# Patient Record
Sex: MALE | Race: WHITE | NOT HISPANIC OR LATINO | Employment: FULL TIME | URBAN - METROPOLITAN AREA
[De-identification: names, ages, dates, MRNs, and addresses within clinical notes are randomized per-mention and may not be internally consistent; named-entity substitution may affect disease eponyms.]

---

## 2024-10-15 ENCOUNTER — HOSPITAL ENCOUNTER (EMERGENCY)
Facility: HOSPITAL | Age: 19
Discharge: HOME | End: 2024-10-15
Attending: STUDENT IN AN ORGANIZED HEALTH CARE EDUCATION/TRAINING PROGRAM
Payer: COMMERCIAL

## 2024-10-15 VITALS
HEART RATE: 73 BPM | WEIGHT: 180 LBS | SYSTOLIC BLOOD PRESSURE: 126 MMHG | HEIGHT: 66 IN | TEMPERATURE: 98.8 F | RESPIRATION RATE: 17 BRPM | DIASTOLIC BLOOD PRESSURE: 64 MMHG | OXYGEN SATURATION: 99 % | BODY MASS INDEX: 28.93 KG/M2

## 2024-10-15 DIAGNOSIS — T78.40XA ALLERGIC REACTION, INITIAL ENCOUNTER: Primary | ICD-10-CM

## 2024-10-15 PROCEDURE — 99283 EMERGENCY DEPT VISIT LOW MDM: CPT

## 2024-10-15 RX ORDER — EPINEPHRINE 0.3 MG/.3ML
1 INJECTION SUBCUTANEOUS ONCE AS NEEDED
Qty: 1 EACH | Refills: 0 | Status: SHIPPED | OUTPATIENT
Start: 2024-10-15

## 2024-10-15 ASSESSMENT — COLUMBIA-SUICIDE SEVERITY RATING SCALE - C-SSRS
6. HAVE YOU EVER DONE ANYTHING, STARTED TO DO ANYTHING, OR PREPARED TO DO ANYTHING TO END YOUR LIFE?: NO
2. HAVE YOU ACTUALLY HAD ANY THOUGHTS OF KILLING YOURSELF?: NO
1. IN THE PAST MONTH, HAVE YOU WISHED YOU WERE DEAD OR WISHED YOU COULD GO TO SLEEP AND NOT WAKE UP?: NO

## 2024-10-15 ASSESSMENT — LIFESTYLE VARIABLES
EVER FELT BAD OR GUILTY ABOUT YOUR DRINKING: NO
TOTAL SCORE: 0
EVER HAD A DRINK FIRST THING IN THE MORNING TO STEADY YOUR NERVES TO GET RID OF A HANGOVER: NO
HAVE PEOPLE ANNOYED YOU BY CRITICIZING YOUR DRINKING: NO
HAVE YOU EVER FELT YOU SHOULD CUT DOWN ON YOUR DRINKING: NO

## 2024-10-16 NOTE — ED PROVIDER NOTES
HPI   Chief Complaint   Patient presents with   • Allergic Reaction       Patient is a 19-year-old male with no significant past medical history who presents for an allergic reaction.  Patient states he was at the gym around 7 PM when he was leaving and noticed he felt warm and developed hives and a rash on his forehead and neck, states he fell like he was having some difficulty breathing.  Took a Benadryl approximately 1.5 hours ago.  States since then his symptoms have improved, no trouble breathing, wheezing, drooling, vomiting, diarrhea.  States his rash has significantly improved, still some mild rash on his forehead, hives have gone.  Patient states he takes magnesium, fish oil, creatinine prior to his workouts, states has been doing this for months.  States no changes today in his doses.  States he takes a daily vitamin.  No other new medicines, foods, lotions, detergents, known exposures.            Patient History   Past Medical History:   Diagnosis Date   • Allergy status to unspecified drugs, medicaments and biological substances 07/08/2014    History of seasonal allergies   • Other conditions influencing health status 07/08/2014    Behavior problem   • Other specified health status 07/08/2014    No pertinent past surgical history   • Personal history of other diseases of the digestive system 07/08/2014    History of constipation   • Personal history of other drug therapy 10/26/2016    History of influenza vaccination     Past Surgical History:   Procedure Laterality Date   • MYRINGOTOMY W/ TUBES  07/31/2017    Myringotomy - With Ventilating Tube Insertion     No family history on file.  Social History     Tobacco Use   • Smoking status: Not on file   • Smokeless tobacco: Not on file   Substance Use Topics   • Alcohol use: Not on file   • Drug use: Not on file       Physical Exam   ED Triage Vitals [10/15/24 2036]   Temperature Heart Rate Respirations BP   37.1 °C (98.8 °F) 79 18 148/88      Pulse Ox Temp  Source Heart Rate Source Patient Position   98 % Temporal -- Sitting      BP Location FiO2 (%)     Right arm --       Physical Exam  Constitutional:       General: He is not in acute distress.  HENT:      Head: Normocephalic.      Mouth/Throat:      Comments: No posterior oropharynx edema, erythema.  Patient is masking secretions.  No stridor, wheezing.  Eyes:      Extraocular Movements: Extraocular movements intact.      Conjunctiva/sclera: Conjunctivae normal.      Pupils: Pupils are equal, round, and reactive to light.   Cardiovascular:      Rate and Rhythm: Normal rate and regular rhythm.      Pulses: Normal pulses.      Heart sounds: Normal heart sounds.   Pulmonary:      Effort: Pulmonary effort is normal.      Breath sounds: Normal breath sounds.   Abdominal:      General: There is no distension.      Palpations: Abdomen is soft. There is no mass.      Tenderness: There is no abdominal tenderness. There is no guarding.   Musculoskeletal:         General: No deformity.      Cervical back: Normal range of motion and neck supple.      Right lower leg: No edema.      Left lower leg: No edema.   Skin:     General: Skin is warm and dry.      Findings: Rash present. No lesion.      Comments: Scant macular rash across forehead.  No hives.   Neurological:      General: No focal deficit present.      Mental Status: He is alert and oriented to person, place, and time. Mental status is at baseline.      Cranial Nerves: No cranial nerve deficit.      Sensory: No sensory deficit.      Motor: No weakness.   Psychiatric:         Mood and Affect: Mood normal.         ED Course & MDM   Diagnoses as of 10/15/24 2111   Allergic reaction, initial encounter                 No data recorded                                 Medical Decision Making  Patient is a 19-year-old male with above-stated past medical history presents for an allergic reaction.  Patient is clinically well-appearing nontoxic.  He has no signs of airway  compromise on exam.  Given it has been over an hour and a half since he took his Benadryl and he has had almost complete resolution of his symptoms, I do not believe requires additional medications at this time.  I did give him a prescription for and EpiPen and advised him to go directly to the pharmacy to have this filled, he stated understanding agreement.  We discussed return precautions as well and I informed that if he is to use his EpiPen, he should call 911 immediately after using it.  He stated understanding agreement.  Patient was given follow-up with allergy and immunology.  Vital signs are unremarkable. Patient is unsure which insurance he will use at the pharmacy, I did attempt to answer the prior authorization questions I informed him that he should get his prescription filled and pad a pocket and he can be reimbursed later.  I advised him that if he was unable to get his prescription filled he should call [phone] if he has any concerning symptoms.  He stated understanding agreement.  Patient was discharged in stable condition.    Disclaimer: This note was dictated using speech recognition software. Minor errors in transcription may be present. Please call if questions.     Tyler Gallardo MD  [facility] Medicine  Contact on [employer]        Problems Addressed:  Allergic reaction, initial encounter: acute illness or injury        Procedure  Procedures     Tyler Gallardo MD  10/15/24 6720

## 2025-05-05 ENCOUNTER — HOSPITAL ENCOUNTER (EMERGENCY)
Facility: HOSPITAL | Age: 20
Discharge: HOME | End: 2025-05-06
Attending: STUDENT IN AN ORGANIZED HEALTH CARE EDUCATION/TRAINING PROGRAM
Payer: COMMERCIAL

## 2025-05-05 ENCOUNTER — APPOINTMENT (OUTPATIENT)
Dept: RADIOLOGY | Facility: HOSPITAL | Age: 20
End: 2025-05-05
Payer: COMMERCIAL

## 2025-05-05 DIAGNOSIS — S61.219A FINGER LACERATION INVOLVING TENDON, INITIAL ENCOUNTER: Primary | ICD-10-CM

## 2025-05-05 PROCEDURE — 73130 X-RAY EXAM OF HAND: CPT | Mod: RIGHT SIDE | Performed by: RADIOLOGY

## 2025-05-05 PROCEDURE — 96365 THER/PROPH/DIAG IV INF INIT: CPT

## 2025-05-05 PROCEDURE — 2500000004 HC RX 250 GENERAL PHARMACY W/ HCPCS (ALT 636 FOR OP/ED): Mod: JZ | Performed by: STUDENT IN AN ORGANIZED HEALTH CARE EDUCATION/TRAINING PROGRAM

## 2025-05-05 PROCEDURE — 96376 TX/PRO/DX INJ SAME DRUG ADON: CPT

## 2025-05-05 PROCEDURE — 73130 X-RAY EXAM OF HAND: CPT | Mod: RT

## 2025-05-05 PROCEDURE — 99285 EMERGENCY DEPT VISIT HI MDM: CPT | Mod: 25 | Performed by: STUDENT IN AN ORGANIZED HEALTH CARE EDUCATION/TRAINING PROGRAM

## 2025-05-05 PROCEDURE — 96375 TX/PRO/DX INJ NEW DRUG ADDON: CPT

## 2025-05-05 RX ORDER — MIDAZOLAM HYDROCHLORIDE 1 MG/ML
2 INJECTION, SOLUTION INTRAMUSCULAR; INTRAVENOUS ONCE
Status: COMPLETED | OUTPATIENT
Start: 2025-05-05 | End: 2025-05-05

## 2025-05-05 RX ORDER — HYDROMORPHONE HYDROCHLORIDE 1 MG/ML
1 INJECTION, SOLUTION INTRAMUSCULAR; INTRAVENOUS; SUBCUTANEOUS ONCE
Status: COMPLETED | OUTPATIENT
Start: 2025-05-05 | End: 2025-05-05

## 2025-05-05 RX ORDER — CEFAZOLIN SODIUM 2 G/50ML
2 SOLUTION INTRAVENOUS ONCE
Status: COMPLETED | OUTPATIENT
Start: 2025-05-05 | End: 2025-05-05

## 2025-05-05 RX ADMIN — CEFAZOLIN SODIUM 2 G: 2 SOLUTION INTRAVENOUS at 21:39

## 2025-05-05 RX ADMIN — HYDROMORPHONE HYDROCHLORIDE 1 MG: 1 INJECTION, SOLUTION INTRAMUSCULAR; INTRAVENOUS; SUBCUTANEOUS at 20:13

## 2025-05-05 RX ADMIN — MIDAZOLAM 2 MG: 1 INJECTION INTRAMUSCULAR; INTRAVENOUS at 23:51

## 2025-05-05 RX ADMIN — HYDROMORPHONE HYDROCHLORIDE 1 MG: 1 INJECTION, SOLUTION INTRAMUSCULAR; INTRAVENOUS; SUBCUTANEOUS at 22:14

## 2025-05-05 ASSESSMENT — PAIN - FUNCTIONAL ASSESSMENT
PAIN_FUNCTIONAL_ASSESSMENT: 0-10
PAIN_FUNCTIONAL_ASSESSMENT: 0-10

## 2025-05-05 ASSESSMENT — PAIN SCALES - GENERAL
PAINLEVEL_OUTOF10: 10 - WORST POSSIBLE PAIN
PAINLEVEL_OUTOF10: 10 - WORST POSSIBLE PAIN

## 2025-05-05 NOTE — Clinical Note
Jorge Lawton was seen and treated in our emergency department on 5/5/2025.  He may return to work on 05/08/2025.       If you have any questions or concerns, please don't hesitate to call.      Jorge Chu MD

## 2025-05-06 ENCOUNTER — HOSPITAL ENCOUNTER (OUTPATIENT)
Dept: RADIOLOGY | Facility: CLINIC | Age: 20
Discharge: HOME | End: 2025-05-06
Payer: COMMERCIAL

## 2025-05-06 ENCOUNTER — OFFICE VISIT (OUTPATIENT)
Dept: ORTHOPEDIC SURGERY | Facility: CLINIC | Age: 20
End: 2025-05-06
Payer: COMMERCIAL

## 2025-05-06 VITALS
SYSTOLIC BLOOD PRESSURE: 143 MMHG | HEART RATE: 72 BPM | DIASTOLIC BLOOD PRESSURE: 82 MMHG | RESPIRATION RATE: 16 BRPM | BODY MASS INDEX: 31.47 KG/M2 | WEIGHT: 195 LBS | OXYGEN SATURATION: 98 % | TEMPERATURE: 99 F

## 2025-05-06 DIAGNOSIS — S61.209A EXTENSOR TENDON LACERATION OF FINGER WITH OPEN WOUND, INITIAL ENCOUNTER: ICD-10-CM

## 2025-05-06 DIAGNOSIS — S67.21XA CRUSH INJURY OF HAND, RIGHT, INITIAL ENCOUNTER: ICD-10-CM

## 2025-05-06 DIAGNOSIS — G89.18 POST-OP PAIN: ICD-10-CM

## 2025-05-06 DIAGNOSIS — S56.429A EXTENSOR TENDON LACERATION OF FINGER WITH OPEN WOUND, INITIAL ENCOUNTER: ICD-10-CM

## 2025-05-06 DIAGNOSIS — S62.620B DISPLACED FRACTURE OF MIDDLE PHALANX OF RIGHT INDEX FINGER, INITIAL ENCOUNTER FOR OPEN FRACTURE: Primary | ICD-10-CM

## 2025-05-06 PROCEDURE — 99153 MOD SED SAME PHYS/QHP EA: CPT | Performed by: STUDENT IN AN ORGANIZED HEALTH CARE EDUCATION/TRAINING PROGRAM

## 2025-05-06 PROCEDURE — 90471 IMMUNIZATION ADMIN: CPT | Performed by: STUDENT IN AN ORGANIZED HEALTH CARE EDUCATION/TRAINING PROGRAM

## 2025-05-06 PROCEDURE — 96375 TX/PRO/DX INJ NEW DRUG ADDON: CPT | Mod: 59

## 2025-05-06 PROCEDURE — 2500000004 HC RX 250 GENERAL PHARMACY W/ HCPCS (ALT 636 FOR OP/ED): Performed by: STUDENT IN AN ORGANIZED HEALTH CARE EDUCATION/TRAINING PROGRAM

## 2025-05-06 PROCEDURE — 73140 X-RAY EXAM OF FINGER(S): CPT | Mod: RT

## 2025-05-06 PROCEDURE — 99205 OFFICE O/P NEW HI 60 MIN: CPT | Performed by: ORTHOPAEDIC SURGERY

## 2025-05-06 PROCEDURE — 96376 TX/PRO/DX INJ SAME DRUG ADON: CPT | Mod: 59

## 2025-05-06 PROCEDURE — 99213 OFFICE O/P EST LOW 20 MIN: CPT | Mod: 25 | Performed by: ORTHOPAEDIC SURGERY

## 2025-05-06 PROCEDURE — 90715 TDAP VACCINE 7 YRS/> IM: CPT | Mod: JZ | Performed by: STUDENT IN AN ORGANIZED HEALTH CARE EDUCATION/TRAINING PROGRAM

## 2025-05-06 PROCEDURE — 12042 INTMD RPR N-HF/GENIT2.6-7.5: CPT | Performed by: STUDENT IN AN ORGANIZED HEALTH CARE EDUCATION/TRAINING PROGRAM

## 2025-05-06 PROCEDURE — 94681 O2 UPTK CO2 OUTP % O2 XTRC: CPT | Mod: 59

## 2025-05-06 PROCEDURE — 2500000005 HC RX 250 GENERAL PHARMACY W/O HCPCS: Performed by: STUDENT IN AN ORGANIZED HEALTH CARE EDUCATION/TRAINING PROGRAM

## 2025-05-06 PROCEDURE — 2500000004 HC RX 250 GENERAL PHARMACY W/ HCPCS (ALT 636 FOR OP/ED): Mod: JZ

## 2025-05-06 RX ORDER — ONDANSETRON HYDROCHLORIDE 2 MG/ML
4 INJECTION, SOLUTION INTRAVENOUS ONCE
Status: COMPLETED | OUTPATIENT
Start: 2025-05-06 | End: 2025-05-06

## 2025-05-06 RX ORDER — CEPHALEXIN 500 MG/1
500 CAPSULE ORAL 4 TIMES DAILY
Qty: 40 CAPSULE | Refills: 0 | Status: SHIPPED | OUTPATIENT
Start: 2025-05-06 | End: 2025-05-16

## 2025-05-06 RX ORDER — OXYCODONE AND ACETAMINOPHEN 5; 325 MG/1; MG/1
1 TABLET ORAL EVERY 6 HOURS PRN
Qty: 12 TABLET | Refills: 0 | Status: SHIPPED | OUTPATIENT
Start: 2025-05-06 | End: 2025-05-06 | Stop reason: SDUPTHER

## 2025-05-06 RX ORDER — ONDANSETRON HYDROCHLORIDE 2 MG/ML
INJECTION, SOLUTION INTRAVENOUS
Status: COMPLETED
Start: 2025-05-06 | End: 2025-05-06

## 2025-05-06 RX ORDER — MIDAZOLAM HYDROCHLORIDE 1 MG/ML
2 INJECTION, SOLUTION INTRAMUSCULAR; INTRAVENOUS ONCE
Status: COMPLETED | OUTPATIENT
Start: 2025-05-06 | End: 2025-05-06

## 2025-05-06 RX ORDER — OXYCODONE AND ACETAMINOPHEN 5; 325 MG/1; MG/1
1 TABLET ORAL EVERY 8 HOURS PRN
Qty: 20 TABLET | Refills: 0 | Status: SHIPPED | OUTPATIENT
Start: 2025-05-06 | End: 2025-05-06 | Stop reason: SDUPTHER

## 2025-05-06 RX ORDER — OXYCODONE AND ACETAMINOPHEN 5; 325 MG/1; MG/1
1 TABLET ORAL EVERY 8 HOURS PRN
Qty: 20 TABLET | Refills: 0 | Status: SHIPPED | OUTPATIENT
Start: 2025-05-06 | End: 2025-05-13

## 2025-05-06 RX ADMIN — ONDANSETRON HYDROCHLORIDE 4 MG: 2 INJECTION, SOLUTION INTRAVENOUS at 02:22

## 2025-05-06 RX ADMIN — ONDANSETRON 4 MG: 2 INJECTION INTRAMUSCULAR; INTRAVENOUS at 02:22

## 2025-05-06 RX ADMIN — Medication 89 MG: at 01:34

## 2025-05-06 RX ADMIN — Medication 44 MG: at 01:55

## 2025-05-06 RX ADMIN — MIDAZOLAM 2 MG: 1 INJECTION INTRAMUSCULAR; INTRAVENOUS at 01:45

## 2025-05-06 RX ADMIN — TETANUS TOXOID, REDUCED DIPHTHERIA TOXOID AND ACELLULAR PERTUSSIS VACCINE, ADSORBED 0.5 ML: 5; 2.5; 8; 8; 2.5 SUSPENSION INTRAMUSCULAR at 03:54

## 2025-05-06 RX ADMIN — Medication 2 L/MIN: at 01:33

## 2025-05-06 ASSESSMENT — LIFESTYLE VARIABLES
EVER FELT BAD OR GUILTY ABOUT YOUR DRINKING: NO
HAVE YOU EVER FELT YOU SHOULD CUT DOWN ON YOUR DRINKING: NO
TOTAL SCORE: 0
HAVE PEOPLE ANNOYED YOU BY CRITICIZING YOUR DRINKING: NO
EVER HAD A DRINK FIRST THING IN THE MORNING TO STEADY YOUR NERVES TO GET RID OF A HANGOVER: NO

## 2025-05-06 NOTE — ED PROVIDER NOTES
Emergency Department Provider Note       History of Present Illness     History provided by: Patient  Limitations to History: None  External Records Reviewed with Brief Summary: None    HPI:  Jorge Lawton is a 19 y.o. male brought in by EMS from work after injury to right index finger.  Patient was working with an industrial  and his finger got caught in a small hole.  Right-hand-dominant.     Physical Exam   Triage vitals:  T 37.2 °C (99 °F)  HR (!) 114  BP (!) 192/90  RR (!) 24  O2 100 % None (Room air)    Physical Exam  Vitals and nursing note reviewed.   Constitutional:       Appearance: Normal appearance. He is not ill-appearing.   HENT:      Head: Atraumatic.   Eyes:      Conjunctiva/sclera: Conjunctivae normal.   Cardiovascular:      Rate and Rhythm: Normal rate.   Pulmonary:      Effort: Pulmonary effort is normal. No respiratory distress.   Abdominal:      General: There is no distension.   Musculoskeletal:        Hands:       Cervical back: Normal range of motion.      Comments: Partial avulsion of distal aspect of right index finger on dorsal side including entire nailbed   Neurological:      Mental Status: He is alert. Mental status is at baseline.   Psychiatric:         Behavior: Behavior normal.           Medical Decision Making & ED Course   Medical Decision Makin y.o. male presenting from work after industrial  accident involving right index finger.  Patient is right-hand dominant.  Large defect just proximal to the nailbed margin on the dorsal aspect of the finger.  Concern for possible extensor tendon involvement.  Patient also has gaping stellate laceration on the pad of the distal index finger likely from crush mechanism.  Patient empirically treated with Ancef for concerns of open fracture.  X-ray obtained.  Patient given hydromorphone IV for analgesia.  Tetanus updated.    Attempted to perform digital block for laceration repair.  Patient has significant phobia with  needles, did not tolerate digital block.  Patient screaming and pulling away.  Concerns over safety due to needlestick or inadvertent injury.  Attempted to treat underlying anxiety with Versed IV.  Was able to perform remainder of digital block, however patient continues to scream out and pull away when attempting sutures.  Due to safety concerns decision was made to proceed with procedural sedation in order to adequately repair large laceration.    See separate procedure notes for detail.  Able to complete laceration repair under sedation.  Large laceration on the dorsal aspect was tacked down, will need follow-up with hand surgery due to concerns for tendon injury and devitalized tissue given extensive area of near complete avulsion.  Superficial skin on the palmar aspect loosely approximated as best as possible given swelling and significant tension.   underlying pulp appears to be intact.  Splinted in full extension.  Will provide with outpatient antibiotics for prophylaxis.  OARRS report was reviewed and verified.  Patient provided with prescription for Percocet to use for pain as needed.  Plan for follow-up with hand surgery in the next few days.    ----        ED Course:  Diagnoses as of 05/06/25 0245   Finger laceration involving tendon, initial encounter       Disposition   As a result of the work-up, the patient was discharged home.  he was informed of his diagnosis and instructed to come back with any concerns or worsening of condition.  he and was agreeable to the plan as discussed above.  he was given the opportunity to ask questions.  All of the patient's questions were answered.    Procedures   Laceration Repair    Performed by: Jorge Chu MD  Authorized by: Jorge Chu MD    Consent:     Consent obtained:  Verbal    Consent given by:  Patient    Risks discussed:  Infection, pain, poor wound healing, need for additional repair and tendon damage  Anesthesia:     Anesthesia method:   Nerve block    Block location:  Digital    Block needle gauge:  25 G    Block anesthetic:  Lidocaine 1% w/o epi    Block injection procedure:  Anatomic landmarks identified, introduced needle, incremental injection, anatomic landmarks palpated and negative aspiration for blood    Block outcome:  Incomplete block  Laceration details:     Location:  Finger    Finger location:  R index finger    Length (cm):  4  Exploration:     Contaminated: yes    Treatment:     Area cleansed with:  Saline and chlorhexidine    Amount of cleaning:  Extensive    Cleaning detail: contamination or visible debris requiring removal and/or extensive cleaning      Irrigation solution:  Sterile saline    Irrigation method:  Syringe  Skin repair:     Repair method:  Sutures    Suture size:  4-0    Suture material:  Prolene    Suture technique:  Simple interrupted    Number of sutures:  9  Approximation:     Approximation:  Close  Repair type:     Repair type:  Intermediate  Post-procedure details:     Dressing:  Splint for protection (Xeroform)    Procedure completion:  Tolerated  Laceration Repair    Performed by: Jorge Chu MD  Authorized by: Jorge Chu MD    Consent:     Consent obtained:  Verbal    Consent given by:  Patient    Risks discussed:  Infection, pain, poor cosmetic result and poor wound healing  Anesthesia:     Anesthesia method:  Local infiltration    Local anesthetic:  Lidocaine 1% w/o epi  Laceration details:     Location:  Finger    Finger location:  R index finger    Length (cm):  2 (Stellate)  Treatment:     Area cleansed with:  Chlorhexidine and saline    Amount of cleaning:  Extensive    Cleaning detail: contamination or visible debris requiring removal and/or extensive cleaning      Irrigation solution:  Sterile saline    Irrigation method:  Syringe  Skin repair:     Repair method:  Sutures    Suture size:  4-0    Suture material:  Prolene    Suture technique:  Simple interrupted    Number of sutures:   4  Approximation:     Approximation:  Loose  Repair type:     Repair type:  Intermediate  Post-procedure details:     Procedure completion:  Tolerated  Moderate Sedation    Performed by: Jorge Chu MD  Authorized by: Jorge Chu MD    Consent:     Consent obtained:  Written    Consent given by:  Patient    Risks discussed:  Respiratory compromise necessitating ventilatory assistance and intubation, nausea, vomiting, inadequate sedation, allergic reaction and prolonged hypoxia resulting in organ damage    Alternatives discussed:  Anxiolysis  Universal protocol:     Protocol observed: The universal protocol was observed before the procedure and is documented in the nursing flowsheets    Indications:     Procedure performed:  Laceration repair    Procedure necessitating sedation performed by:  Physician performing sedation    Level of sedation:  Moderate  Pre-sedation assessment:     Mallampati score:  I - soft palate, uvula, fauces, pillars visible  Immediate pre-procedure details:     Monitoring: The patient is on appropriate monitoring (Including: 3 or 5 lead EKG, Pulse Oximetry, Capnography, and Blood Pressure monitoring), oxygenation has been addressed, and critical airway and emergency equipment is immediately available before the initiation of sedation      Reassessment: Patient reassessed immediately prior to procedure      Reviewed: vital signs and NPO status    Procedure details (see MAR for exact dosages):     Sedation start time:  5/6/2025 1:35 AM    Sedation end time:  5/6/2025 2:05 AM    Total sedation time (minutes):  30  Post-procedure details:     Attendance: Constant attendance by certified staff until patient recovered      Procedure completion:  Tolerated          Jorge Chu MD  Emergency Medicine                                                            Jorge Chu MD  05/06/25 2071

## 2025-05-06 NOTE — PROGRESS NOTES
History present illness: Patient presents today for evaluation status post injury to the right index finger that occurred in the workplace.  He was working with a .  The patient was seen in the emergency department.  Basic wound care was provided.  Sutures were placed.  Tetanus was updated.  He was given antibiotics while in the emergency department and then a prescription which he did fill.  He presents with his mother.      Past medical history: The patient's past medical history, family history, social history, and review of systems were documented on the patient medical intake.  The updated data was reviewed in the electronic medical record.  History is negative except otherwise stated in history of present illness.        Physical examination:  General: Alert and oriented to person, place, and time.  No acute distress and breathing comfortably: Pleasant and cooperative with examination.  HEENT: Head is normocephalic and atraumatic.  Neck: Supple, no visible swelling.  Cardiovascular: No palpable tachycardia  Lungs: No audible wheezing or labored breathing  Abdomen: Nondistended.  Extremities: Evaluation of the right upper extremity finds the patient had palpable radial artery at the wrist with brisk capillary refill to all digits.  Patient has intact sensation to axillary radial median and ulnar nerves.  Complex laceration to extensor zone 1 right index.  There are no signs of infection.  There is no evidence of lymphedema or lymphatic streaking.  The patient has supple compartments to right arm forearm and hand.  Very anxious and apprehensive.  Demonstrates no active range of motion through extensor zone 1 or profundus to index.      Radiology: Right index finger fracture of middle phalanx with likely intra-articular extension.  Particulate noted on lateral image dorsal to the distal phalanx likely consistent with indwelling foreign body      Assessment: Flex right index fingertip injury with open  fracture of middle phalanx indwelling particulate and complex soft tissue injury      Plan: Treatment options were discussed.  We talked about operative and nonoperative strategies.  Recommendations were made for open exploration with excisional debridement with operative stabilization of bony injury and removal of contaminant with soft tissue repair as indicated.  Patient is agreeable with this strategy.  Nonadherent soft dressing and splint were placed.  Plan for surgery tomorrow under digital block anesthesia with sedation versus general anesthetic.  Persist with oral antibiotic dosing.  Seek stat approval for surgery tomorrow to minimize risk of osteomyelitis.        Procedure:

## 2025-05-06 NOTE — DISCHARGE INSTRUCTIONS
Please follow-up with hand surgery in 1 to 2 days for further evaluation and management.  Take antibiotics as prescribed for prevention of infection.  Keep splint on finger, keep dressing dry.

## 2025-05-07 PROCEDURE — 20103 EXPL PENTRG WOUND EXTREMITY: CPT | Performed by: PHYSICIAN ASSISTANT

## 2025-05-07 PROCEDURE — 26418 REPAIR FINGER TENDON: CPT | Performed by: ORTHOPAEDIC SURGERY

## 2025-05-07 PROCEDURE — 20103 EXPL PENTRG WOUND EXTREMITY: CPT | Performed by: ORTHOPAEDIC SURGERY

## 2025-05-07 PROCEDURE — 11760 REPAIR OF NAIL BED: CPT | Performed by: ORTHOPAEDIC SURGERY

## 2025-05-07 PROCEDURE — 26746 TREAT FINGER FRACTURE EACH: CPT | Performed by: ORTHOPAEDIC SURGERY

## 2025-05-07 PROCEDURE — 11012 DEB SKIN BONE AT FX SITE: CPT | Performed by: ORTHOPAEDIC SURGERY

## 2025-05-20 ENCOUNTER — OFFICE VISIT (OUTPATIENT)
Dept: ORTHOPEDIC SURGERY | Facility: CLINIC | Age: 20
End: 2025-05-20
Payer: COMMERCIAL

## 2025-05-20 ENCOUNTER — HOSPITAL ENCOUNTER (OUTPATIENT)
Dept: RADIOLOGY | Facility: CLINIC | Age: 20
Discharge: HOME | End: 2025-05-20
Payer: COMMERCIAL

## 2025-05-20 DIAGNOSIS — S61.209A EXTENSOR TENDON LACERATION OF FINGER WITH OPEN WOUND, INITIAL ENCOUNTER: ICD-10-CM

## 2025-05-20 DIAGNOSIS — S67.21XA CRUSH INJURY OF HAND, RIGHT, INITIAL ENCOUNTER: ICD-10-CM

## 2025-05-20 DIAGNOSIS — S61.219A FINGER LACERATION INVOLVING TENDON, INITIAL ENCOUNTER: ICD-10-CM

## 2025-05-20 DIAGNOSIS — S62.620B DISPLACED FRACTURE OF MIDDLE PHALANX OF RIGHT INDEX FINGER, INITIAL ENCOUNTER FOR OPEN FRACTURE: Primary | ICD-10-CM

## 2025-05-20 DIAGNOSIS — S56.429A EXTENSOR TENDON LACERATION OF FINGER WITH OPEN WOUND, INITIAL ENCOUNTER: ICD-10-CM

## 2025-05-20 PROCEDURE — 99024 POSTOP FOLLOW-UP VISIT: CPT | Performed by: ORTHOPAEDIC SURGERY

## 2025-05-20 PROCEDURE — 73140 X-RAY EXAM OF FINGER(S): CPT | Mod: RT

## 2025-05-20 PROCEDURE — 99213 OFFICE O/P EST LOW 20 MIN: CPT | Performed by: ORTHOPAEDIC SURGERY

## 2025-05-20 RX ORDER — MULTIVITAMIN WITH IRON
1 TABLET ORAL DAILY
COMMUNITY

## 2025-05-21 ENCOUNTER — TELEPHONE (OUTPATIENT)
Dept: ORTHOPEDIC SURGERY | Facility: CLINIC | Age: 20
End: 2025-05-21
Payer: COMMERCIAL

## 2025-05-21 ENCOUNTER — OFFICE VISIT (OUTPATIENT)
Dept: ORTHOPEDIC SURGERY | Facility: CLINIC | Age: 20
End: 2025-05-21
Payer: COMMERCIAL

## 2025-05-21 DIAGNOSIS — M25.562 LEFT KNEE PAIN, UNSPECIFIED CHRONICITY: Primary | ICD-10-CM

## 2025-05-21 PROCEDURE — 99212 OFFICE O/P EST SF 10 MIN: CPT | Performed by: INTERNAL MEDICINE

## 2025-05-21 PROCEDURE — 99024 POSTOP FOLLOW-UP VISIT: CPT | Performed by: INTERNAL MEDICINE

## 2025-05-21 NOTE — TELEPHONE ENCOUNTER
Patient called stating he didn't receive a splint fro his finger while at his visit he was supposed to get one. Patient was called and advised to come into the walk in clinic.   Patient understood.

## 2025-05-21 NOTE — TELEPHONE ENCOUNTER
Patient called the clinical line and spoke with ARMANDO who told him to come to the walk in clinic to get re-splinted.    I wrote note for his work.

## 2025-05-21 NOTE — TELEPHONE ENCOUNTER
Jorge called in he is concerned that he did not get a splint yesterday during his appointment and is wondering if he should have one. He would also like work note stating he was at the appointment that he is not cleared to return to work.

## 2025-05-21 NOTE — LETTER
May 21, 2025     Patient: Jorge Lawton   YOB: 2005   Date of Visit: 5/20/2025       To Whom It May Concern:    Jorge Lawton was seen in my clinic on 5/20/2025 at . Please excuse Jorge for his absence from work on this day to make the appointment.  He will remain out of work until cleared by orthopedics.    If you have any questions or concerns, please don't hesitate to call [phone].         Sincerely,         Thomas Swan, DO

## 2025-05-21 NOTE — PROGRESS NOTES
Acute Injury New Patient Visit    CC: No chief complaint on file.      HPI: Jorge is a 19 y.o. male presents today for splint application .        Review of Systems   GENERAL: Negative for malaise, significant weight loss, fever  MUSCULOSKELETAL: See HPI  NEURO:  Negative for numbness / tingling     Past Medical History  Medical History[1]    Medication review  Medication Documentation Review Audit       Reviewed by Mar Gipson MA (Medical Assistant) on 25 at 1543      Medication Order Taking? Sig Documenting Provider Last Dose Status   cephalexin (Keflex) 500 mg capsule [id number]  Take 1 capsule (500 mg) by mouth 4 times a day for 10 days. Jorge Chu MD   25 2359   EPINEPHrine (Epipen) 0.3 mg/0.3 mL injection syringe [id number]  Inject 0.3 mL (0.3 mg) into the muscle 1 time if needed for anaphylaxis for up to 1 dose. Inject into upper leg. Call 911 after use. Tyler Gallardo MD  Active   multivitamin (Multiple Vitamins) tablet [id number]  Take 1 tablet by mouth once daily. Historical Provider, MD  Active   oxyCODONE-acetaminophen (Percocet) 5-325 mg tablet [id number]  Take 1 tablet by mouth every 8 hours if needed for severe pain (7 - 10) for up to 7 days. Veronica Holt PA-C   25 235                    Allergies  RX Allergies[2]    Social History  Social History     Socioeconomic History    Marital status: Single     Spouse name: Not on file    Number of children: Not on file    Years of education: Not on file    Highest education level: Not on file   Occupational History    Not on file   Tobacco Use    Smoking status: Unknown    Smokeless tobacco: Not on file   Substance and Sexual Activity    Alcohol use: Not on file    Drug use: Not on file    Sexual activity: Not on file   Other Topics Concern    Not on file   Social History Narrative    Not on file     Social Drivers of Health     Financial Resource Strain: Not on file   Food Insecurity: Not on file    Transportation Needs: Not on file   Physical Activity: Not on file   Stress: Not on file   Social Connections: Not on file   Intimate Partner Violence: Not on file   Housing Stability: Not on file       Surgical History  Surgical History[3]    Physical Exam:  GENERAL:  Patient is awake, alert, and oriented to person place and time.  Patient appears well nourished and well kept.  Affect Calm, Not Acutely Distressed.  HEENT:  Normocephalic, Atraumatic, EOMI  CARDIOVASCULAR:  Hemodynamically stable.  RESPIRATORY:  Normal respirations with unlabored breathing.  Extremity: Right index finger shows surgical pain intact.  There is no clinical signs infection.      Diagnostics: None today  XR fingers right 2+ views  Interpreted By:  Thomas Daly,   STUDY:  XR FINGERS RIGHT 2+ VIEWS; 5/20/2025 3:54 pm      INDICATION:  Signs/Symptoms:pain.      ACCESSION NUMBER(S):  [id number]      ORDERING CLINICIAN:  THOMAS DALY      FINDINGS:  X-rays of the right index finger show complex fracture to the middle  and distal phalanx. Indwelling K-wire fixation shows no hardware  failure or migration.          Signed by: Thomas Daly 5/20/2025 3:58 PM  Dictation workstation:   [id number]      Procedure: None    Assessment: Splint application    Plan: Jorge presents today for splint application, a U-shaped AlumaFoam splint was applied.  He will keep his appointment as scheduled with Dr. Daly.    No orders of the defined types were placed in this encounter.     At the conclusion of the visit there were no further questions by the patient/family regarding their plan of care.  Patient was instructed to call or return with any issues, questions, or concerns regarding their injury and/or treatment plan described above.     05/21/25 at 4:09 PM - Alfonso Johnston MD    Office: [phone]    We already utilize the scribe attestation, Aamir ESCUDERO am scribing for, and in the presence of Dr. Johnston.    Alfonso ESCUDERO MD  personally performed the services described in the documentation as scribed by Aamir Hardin in my presence, and confirm it is both accurate and complete.    This note was prepared using voice recognition software.  The details of this note are correct and have been reviewed, and corrected to the best of my ability.  Some grammatical errors may persist related to the Dragon software.         [1]   Past Medical History:  Diagnosis Date    Allergy status to unspecified drugs, medicaments and biological substances 07/08/2014    History of seasonal allergies    Other conditions influencing health status 07/08/2014    Behavior problem    Other specified health status 07/08/2014    No pertinent past surgical history    Personal history of other diseases of the digestive system 07/08/2014    History of constipation    Personal history of other drug therapy 10/26/2016    History of influenza vaccination   [2]   Allergies  Allergen Reactions    Bee Pollen Other    Tree And Shrub Pollen Itching   [3]   Past Surgical History:  Procedure Laterality Date    MYRINGOTOMY W/ TUBES  07/31/2017    Myringotomy - With Ventilating Tube Insertion

## 2025-06-02 ENCOUNTER — HOSPITAL ENCOUNTER (OUTPATIENT)
Dept: RADIOLOGY | Facility: CLINIC | Age: 20
Discharge: HOME | End: 2025-06-02
Payer: COMMERCIAL

## 2025-06-02 ENCOUNTER — OFFICE VISIT (OUTPATIENT)
Dept: ORTHOPEDIC SURGERY | Facility: CLINIC | Age: 20
End: 2025-06-02
Payer: COMMERCIAL

## 2025-06-02 DIAGNOSIS — S61.219A FINGER LACERATION INVOLVING TENDON, INITIAL ENCOUNTER: ICD-10-CM

## 2025-06-02 PROCEDURE — 99211 OFF/OP EST MAY X REQ PHY/QHP: CPT | Performed by: ORTHOPAEDIC SURGERY

## 2025-06-02 PROCEDURE — 73140 X-RAY EXAM OF FINGER(S): CPT | Mod: RIGHT SIDE | Performed by: RADIOLOGY

## 2025-06-02 PROCEDURE — 73140 X-RAY EXAM OF FINGER(S): CPT | Mod: RT

## 2025-06-02 NOTE — PROGRESS NOTES
6/2/2025    Chief Complaint   Patient presents with    Right Index Finger - Follow-up     I&D with open treatment to middle phalanx fx  DOS: 5/7/25  Xrays today       History of Present Illness:  Patient Jorge Lawton , 19 y.o. male, presents today, 6/2/2025, for evaluation of right index finger complex open injury to right index finger of middle phalanx fracture, he is 4 weeks out from surgical intervention.  He states he been compliant with nonweightbearing restrictions and splinting.  He has been apprehensive to use the finger at all.  His therapy was just recently approved and he is scheduled for his first visit in 2 days on Wednesday.  No fevers, chills, constitutional symptoms.         Review of Systems:   GENERAL: Negative  GI: Negative  MUSCULOSKELETAL: See HPI  SKIN: Negative  NEURO:  Negative     Physical Exam:  GENERAL:  Alert and oriented to person, place, and time.  No acute distress and breathing comfortably; pleasant and cooperative with the examination.  HEENT:  Head is normocephalic and atraumatic.  NECK:  Supple, no visible swelling.  CARDIOVASCULAR:  No palpable tachycardia.  LUNGS:  No audible wheezing or labored breathing.  ABDOMEN:  Nondistended.  Extremities: The surgical incision is clean, dry, intact, and appears to be healing well.  No active bleeding, erythema, warmth, drainage, or signs of infection.  Appropriate functional ROM demonstrated with flexion/extension of the digits, and flexion/extension/pronosupination of the wrist.  Passive extension maintained across distal interphalangeal joint.  He is tender and swollen across the PIP with very limited range of motion to flexion of only about 35 degrees.     Imaging/Test Results:  3 views of the right index finger show evidence of healing middle phalanx fracture with continued adequate alignment.  No evidence of hardware failure or migration.     Assessment:  Complex open injury to right index finger middle phalanx fracture, 4 weeks  out from excisional debridement and open treatment.     Plan:  Continue strict nonweightbearing and remain out of work.  We will remove the transverse pin today.  This was performed in office and tolerated well.  Will retain the longitudinal pin for an additional 4 weeks.  In 4 weeks he will follow-up for repeat clinical and radiographic exam, x-rays 3 views of the right index finger upon return.  We discussed with patient and family we would recommend for digital block at that visit and then pin removal.  Patient was amenable to this.  After pin was removed sterile dressing was applied and patient instructed to keep this intact for 24 hours then transition to simple Band-Aid and continue with U-shaped AlumaFoam splinting.  All questions answered today's visit.    Veronica Holt PA-C

## 2025-06-04 ENCOUNTER — EVALUATION (OUTPATIENT)
Dept: OCCUPATIONAL THERAPY | Facility: CLINIC | Age: 20
End: 2025-06-04
Payer: COMMERCIAL

## 2025-06-04 DIAGNOSIS — S56.429A EXTENSOR TENDON LACERATION OF FINGER WITH OPEN WOUND, INITIAL ENCOUNTER: ICD-10-CM

## 2025-06-04 DIAGNOSIS — S61.219A FINGER LACERATION INVOLVING TENDON, INITIAL ENCOUNTER: Primary | ICD-10-CM

## 2025-06-04 DIAGNOSIS — S62.620B DISPLACED FRACTURE OF MIDDLE PHALANX OF RIGHT INDEX FINGER, INITIAL ENCOUNTER FOR OPEN FRACTURE: ICD-10-CM

## 2025-06-04 DIAGNOSIS — S61.209A EXTENSOR TENDON LACERATION OF FINGER WITH OPEN WOUND, INITIAL ENCOUNTER: ICD-10-CM

## 2025-06-04 DIAGNOSIS — S67.21XA CRUSH INJURY OF HAND, RIGHT, INITIAL ENCOUNTER: ICD-10-CM

## 2025-06-04 PROCEDURE — 97165 OT EVAL LOW COMPLEX 30 MIN: CPT | Mod: GO

## 2025-06-04 PROCEDURE — 97110 THERAPEUTIC EXERCISES: CPT | Mod: GO

## 2025-06-04 ASSESSMENT — PATIENT HEALTH QUESTIONNAIRE - PHQ9
SUM OF ALL RESPONSES TO PHQ9 QUESTIONS 1 AND 2: 0
2. FEELING DOWN, DEPRESSED OR HOPELESS: NOT AT ALL
1. LITTLE INTEREST OR PLEASURE IN DOING THINGS: NOT AT ALL

## 2025-06-04 ASSESSMENT — PAIN DESCRIPTION - DESCRIPTORS: DESCRIPTORS: ACHING

## 2025-06-04 ASSESSMENT — PAIN - FUNCTIONAL ASSESSMENT: PAIN_FUNCTIONAL_ASSESSMENT: 0-10

## 2025-06-04 ASSESSMENT — PAIN SCALES - GENERAL: PAINLEVEL_OUTOF10: 2

## 2025-06-04 NOTE — PROGRESS NOTES
"    Occupational Therapy   Upper Extremity Evaluation Note    Patient Name: Jorge Lawton  MRN: [id number]   Date of Injury: 5/5/25  Mechanism of Injury: Work injury got stuck in    Date of Surgery: 5/7/25  Surgical Procedure: Pins and extensor tendon   Precautions:  NWB RUE          Current Problem  1. Finger laceration involving tendon, initial encounter  Referral to Occupational Therapy      2. Displaced fracture of middle phalanx of right index finger, initial encounter for open fracture  Referral to Occupational Therapy      3. Extensor tendon laceration of finger with open wound, initial encounter  Referral to Occupational Therapy      4. Crush injury of hand, right, initial encounter  Referral to Occupational Therapy       Insurance  [facility] OT 12V OPEN DATE RANGE-[place]     Medicare Certification Period:     GENERAL  General  General Comment: Visit #1/12    IMAGING: Redemonstration of fracture K-wire fixation of a comminuted fracture  through the 2nd distal phalanx. Osseous productive changes and soft  tissue edema present. The hardware is intact           Subjective  Pt report: \"I am just scared I am going to hurt it.\"  Patient with good adherence to NWB RUE per report, also changing dressing daily letting index finger air dry for 1-2 hours while resting with hand/arm supported   Patient would like to functionally improve/chief concern: Increase ROM, decrease edema, get strength back, return to work and lifting at the gym       Pain:  Location: Entire index finger Right   0/10 at rest, 9/10 at highest  Description: aching, throbbing, sharp sometimes     ASSESSMENT:    Patient is a 19 y.o. RHDM who is s/p pin fixation of PIP and DIP of R IF with extensor tendon involved     Homeliving/Social Support: house multilevel, Girlfriend, girlfriends grandmother, and girlfriends little sister     Work: [employer]     Meaningful Activities: go to the gym, spending time with family and friends, fishing, " driving    Previous Level of Function Per Patient/Caregiver Report: Independent with ADL, IADL, work and leisure activities    Chief complaint: stiffness, pain, swelling, arm stiffness     Patient stated goals for therapy: gain use of my finger     Objective  Hand Dominance: RIGHT     Affected Extremity:   RIGHT     Outcome Measures:   At Eval: Quick Dash 75    ADLS/IADLS: MOD IND    Skin/ Wound/Scar: Scar flat, healing well. Tender as expected for stage of recovery.     Sensation: WFL    Dexterity: Monitor    Special Tests: NA         Edema (cm):   Date: 6/4/25 Right Left   WRIST DWC 17.6 -          Date: 6/4/25 Right Left   FINGER IF P1 7.7 -    PIP Circumference 7.1 -    P2 - -    DIP Circumference - -       ROM and STRENGTH      AROM  Right Left   Elbow Extension/Flexion           Forearm  Pronation/Supination           Wrist Extension/Flexion 55/65     Radial Deviation/Ulnar Deviation       Hand ROM  THUMB AROM  Right Left    [place] 10/10 10/10          Palmar Abduction -     Radial Abduction -         Finger   Index Middle Ring Small    MCP 45 - - -    PIP 0 - - -    DIP 0 - - -   ADPC (cm)            Hand Strength  DEFERRED   Roman Dynamometer    Gross Grasp (lbs)  Right Left   2nd setting - -       Pinch Strength Right Left   Lateral - -   Tripod - -   Tip  - -       TREATMENT:  -Measurements taken and PLOF/history recorded   -Edema Management including elevation, cryotherapy, Compression   -Retrograde massage to reduce swelling   -Lumbrical grasp with support on volar hand to inc MCP flexion/ext    HEP and Patient Education:  -See treatment section above.   -Orthosis full time off for hygiene and exercises. Educated patient to wear, care, purpose, and precautions.  -Educated patient to diagnosis and rehab expectations including frequency and duration of services.         PLAN OF CARE:   Continue to follow NWB RUE and NO ROM of PIP and DIP until cleared by physician   Follow Up with Referring Physician:  Dr. Thomas Swan  [facility]  Patient instructed to call or email with questions or concerns.     Frequency: 2x/week  Duration: 6 weeks    GOALS:   Patient to demonstrate, with involved extremity (ies):    -good skill with progressive edema reduction technique facilitating gains with motion and function.   -good carry through with progressive soft tissue management techniques facilitating gains with motion and pain reduction.   -forearm AROM BALL 120 degrees, to carry and place dishware as desired and/or steer vehicle.   -wrist AROM BALL 120 degrees, to tolerate four point position during cleaning and shifting body position(s).  -finger AROM BALL 220 degrees, to maintain grasp on ADL objects during use.  -Self report of independence with clothing fasteners without pain.   -independence opening packages, [place] Pinch 16#  -independence opening jars and turning door knobs,  Strength 70#  -good skill with progressive orthosis regimen, applying orthosis correctly and using according to medically necessary wear schedule as prescribed by therapist  -Patient to report pain 0/10 at rest for sleeping  -Patient to score disability rate less than 10% on Quick DASH  Patient verbalized good understanding of Therapy Plan of Care and is in a agreement with Occupational Therapy Goals.       CHART REVIEW: YES

## 2025-06-06 ENCOUNTER — TREATMENT (OUTPATIENT)
Dept: OCCUPATIONAL THERAPY | Facility: CLINIC | Age: 20
End: 2025-06-06
Payer: COMMERCIAL

## 2025-06-06 DIAGNOSIS — S61.219A FINGER LACERATION INVOLVING TENDON, INITIAL ENCOUNTER: Primary | ICD-10-CM

## 2025-06-06 DIAGNOSIS — S62.620B DISPLACED FRACTURE OF MIDDLE PHALANX OF RIGHT INDEX FINGER, INITIAL ENCOUNTER FOR OPEN FRACTURE: ICD-10-CM

## 2025-06-06 DIAGNOSIS — S67.21XA CRUSH INJURY OF HAND, RIGHT, INITIAL ENCOUNTER: ICD-10-CM

## 2025-06-06 PROCEDURE — 97110 THERAPEUTIC EXERCISES: CPT | Mod: GO

## 2025-06-06 PROCEDURE — 97140 MANUAL THERAPY 1/> REGIONS: CPT | Mod: GO

## 2025-06-06 ASSESSMENT — PAIN - FUNCTIONAL ASSESSMENT: PAIN_FUNCTIONAL_ASSESSMENT: 0-10

## 2025-06-06 ASSESSMENT — PAIN SCALES - GENERAL: PAINLEVEL_OUTOF10: 0 - NO PAIN

## 2025-06-06 NOTE — PROGRESS NOTES
Occupational Therapy   Upper Extremity Evaluation Note    Patient Name: Jorge Lawton  MRN: [id number]   Date of Injury: 5/5/25  Mechanism of Injury: Work injury got stuck in    Date of Surgery: 5/7/25  Surgical Procedure: Pins and extensor tendon   Precautions:  NWB MELE          Current Problem  1. Finger laceration involving tendon, initial encounter        2. Crush injury of hand, right, initial encounter        3. Displaced fracture of middle phalanx of right index finger, initial encounter for open fracture           Insurance  [facility] OT 12V OPEN DATE RANGE-[place]     Medicare Certification Period:     GENERAL  General  General Comment: Visit #2/12    IMAGING: Redemonstration of fracture K-wire fixation of a comminuted fracture  through the 2nd distal phalanx. Osseous productive changes and soft  tissue edema present. The hardware is intact           Subjective  Pt report: Has been practicing folding laundry for exercise reports no pain and activity Is getting easier. Patient with limited pain during todays session.   Patient would like to functionally improve/chief concern: Increase ROM, decrease edema, get strength back, return to work and lifting at the gym       Pain:  Location: Entire index finger Right   0/10 at rest, 9/10 at highest  Description: aching, throbbing, sharp sometimes     ASSESSMENT:  Patient with good tolerance to OT session. Demonstrating dec ability to complete fmc. Patient with demonstrating inc ROM in R IF MCP with activity. Patient will benefit from skilled OT services to decrease swelling and increase ROM, strength, and muscular endurance once pin removed in R IF.   Patient is a 19 y.o. RHDM who is s/p pin fixation of PIP and DIP of R IF with extensor tendon involved     Homeliving/Social Support: house multilevel, Girlfriend, girlfriends grandmother, and girlfriends little sister     Work: [employer]     Meaningful Activities: go to the gym, spending time with family and  friends, fishing, driving    Previous Level of Function Per Patient/Caregiver Report: Independent with ADL, IADL, work and leisure activities    Chief complaint: stiffness, pain, swelling, arm stiffness     Patient stated goals for therapy: gain use of my finger     Objective  Hand Dominance: RIGHT     Affected Extremity:   RIGHT     Outcome Measures:   At Eval: Quick Dash 75    ADLS/IADLS: MOD IND    Skin/ Wound/Scar: Scar flat, healing well. Tender as expected for stage of recovery.     Sensation: WFL    Dexterity: Monitor    Special Tests: NA         Edema (cm):   Date: 6/4/25 Right Left   WRIST DWC 17.6 -          Date: 6/4/25 Right Left   FINGER IF P1 7.7 -    PIP Circumference 7.1 -    P2 - -    DIP Circumference - -       ROM and STRENGTH      AROM  Right Left   Elbow Extension/Flexion           Forearm  Pronation/Supination           Wrist Extension/Flexion 55/65     Radial Deviation/Ulnar Deviation       Hand ROM  THUMB AROM  Right Left    [place] 10/10 10/10          Palmar Abduction -     Radial Abduction -         Finger   Index Middle Ring Small    MCP 45 - - -    PIP 0 - - -    DIP 0 - - -   ADPC (cm)   DPC DPC DPC       Hand Strength  DEFERRED   Roman Dynamometer    Gross Grasp (lbs)  Right Left   2nd setting - -       Pinch Strength Right Left   Lateral - -   Tripod - -   Tip  - -       TREATMENT:  -Measurements taken  -Edema Management including elevation, cryotherapy, Compression   -Retrograde massage to reduce swelling   -Pro/sup stretch with yellow tbar in hand, index finger extended throughout no actively holding onto tbar, no strengthening, just ROM   -Ball rolls flex/ext wrist with R IF in ext   -Small peg board using MF-Thumb for FMC and in hand manipulation   -Lumbrical grasp with support on volar hand to inc MCP flexion/ext    HEP and Patient Education:  -See treatment section above.   -Orthosis full time off for hygiene and exercises. Educated patient to wear, care, purpose, and  precautions.  -Educated patient to diagnosis and rehab expectations including frequency and duration of services.         PLAN OF CARE:   Continue to follow NWB RUE and NO ROM of PIP and DIP until cleared by physician.   Follow Up with Referring Physician: Dr. Thomas Swan  Monitor [facility]  Patient instructed to call or email with questions or concerns.     Frequency: 2x/week  Duration: 6 weeks    GOALS:   Patient to demonstrate, with involved extremity (ies):    -good skill with progressive edema reduction technique facilitating gains with motion and function.   -good carry through with progressive soft tissue management techniques facilitating gains with motion and pain reduction.   -forearm AROM BALL 120 degrees, to carry and place dishware as desired and/or steer vehicle.   -wrist AROM BALL 120 degrees, to tolerate four point position during cleaning and shifting body position(s).  -finger AROM BALL 220 degrees, to maintain grasp on ADL objects during use.  -Self report of independence with clothing fasteners without pain.   -independence opening packages, [place] Pinch 16#  -independence opening jars and turning door knobs,  Strength 70#  -good skill with progressive orthosis regimen, applying orthosis correctly and using according to medically necessary wear schedule as prescribed by therapist  -Patient to report pain 0/10 at rest for sleeping  -Patient to score disability rate less than 10% on Quick DASH  Patient verbalized good understanding of Therapy Plan of Care and is in a agreement with Occupational Therapy Goals.       CHART REVIEW: YES       OT Therapeutic Procedures Time Entry  Manual Therapy Time Entry: 15  Therapeutic Exercise Time Entry: 30

## 2025-06-11 ENCOUNTER — TREATMENT (OUTPATIENT)
Dept: OCCUPATIONAL THERAPY | Facility: CLINIC | Age: 20
End: 2025-06-11
Payer: COMMERCIAL

## 2025-06-11 DIAGNOSIS — S67.21XA CRUSH INJURY OF HAND, RIGHT, INITIAL ENCOUNTER: ICD-10-CM

## 2025-06-11 DIAGNOSIS — S62.620B DISPLACED FRACTURE OF MIDDLE PHALANX OF RIGHT INDEX FINGER, INITIAL ENCOUNTER FOR OPEN FRACTURE: ICD-10-CM

## 2025-06-11 DIAGNOSIS — S61.209A EXTENSOR TENDON LACERATION OF FINGER WITH OPEN WOUND, INITIAL ENCOUNTER: ICD-10-CM

## 2025-06-11 DIAGNOSIS — S61.219A FINGER LACERATION INVOLVING TENDON, INITIAL ENCOUNTER: Primary | ICD-10-CM

## 2025-06-11 DIAGNOSIS — S56.429A EXTENSOR TENDON LACERATION OF FINGER WITH OPEN WOUND, INITIAL ENCOUNTER: ICD-10-CM

## 2025-06-11 PROCEDURE — 97110 THERAPEUTIC EXERCISES: CPT | Mod: GO

## 2025-06-11 ASSESSMENT — PAIN - FUNCTIONAL ASSESSMENT: PAIN_FUNCTIONAL_ASSESSMENT: 0-10

## 2025-06-11 ASSESSMENT — PAIN SCALES - GENERAL: PAINLEVEL_OUTOF10: 2

## 2025-06-11 NOTE — PROGRESS NOTES
Occupational Therapy   Upper Extremity Evaluation Note    Patient Name: Jorge Lawton  MRN: [id number]   Date of Injury: 5/5/25  Mechanism of Injury: Work injury got stuck in    Date of Surgery: 5/7/25  Surgical Procedure: Pins and extensor tendon   Precautions:  NWCAMRON SHABAZZ          Current Problem  1. Finger laceration involving tendon, initial encounter        2. Crush injury of hand, right, initial encounter        3. Displaced fracture of middle phalanx of right index finger, initial encounter for open fracture        4. Extensor tendon laceration of finger with open wound, initial encounter             Insurance  [facility] OT 12V OPEN DATE RANGE-[place]     Medicare Certification Period:     GENERAL  General  General Comment: Visit #3/12    IMAGING: Redemonstration of fracture K-wire fixation of a comminuted fracture  through the 2nd distal phalanx. Osseous productive changes and soft  tissue edema present. The hardware is intact           Subjective  Pt report: Patient report folding laundry has gotten easier.  Increased sensation to finger tip   Patient would like to functionally improve/chief concern: Increase ROM, decrease edema, get strength back, return to work and lifting at the gym       Pain:  Location: Entire index finger Right   0/10 at rest, 9/10 at highest  Description: aching, throbbing, sharp sometimes     ASSESSMENT:  Patient with good tolerance to OT session. Patient with mild inc in pain during activity dt pt report of sensation returning in R IF. Patient will benefit from skilled OT services to decrease swelling and increase ROM, strength, and muscular endurance once pin removed in R IF.   Patient is a 19 y.o. RHDM who is s/p pin fixation of PIP and DIP of R IF with extensor tendon involved     Homeliving/Social Support: house multilevel, Girlfriend, girlfriends grandmother, and girlfriends little sister     Work: [employer]     Meaningful Activities: go to the gym, spending time with  family and friends, fishing, driving    Previous Level of Function Per Patient/Caregiver Report: Independent with ADL, IADL, work and leisure activities    Chief complaint: stiffness, pain, swelling, arm stiffness     Patient stated goals for therapy: gain use of my finger     Objective  Hand Dominance: RIGHT     Affected Extremity:   RIGHT     Outcome Measures:   At Eval: Quick Dash 75    ADLS/IADLS: MOD IND    Skin/ Wound/Scar: Scar flat, healing well. Tender as expected for stage of recovery.     Sensation: WFL    Dexterity: Monitor    Special Tests: NA         Edema (cm):   Date: 6/11/25 Right Left   WRIST DWC 16.6 -          Date: 6/4/25 Right Left   FINGER IF P1 7.7 -    PIP Circumference 7.1 -    P2 - -    DIP Circumference - -       ROM and STRENGTH      AROM  Right Left   Elbow Extension/Flexion           Forearm  Pronation/Supination           Wrist Extension/Flexion 55/65     Radial Deviation/Ulnar Deviation       Hand ROM  THUMB AROM  Right Left    [place] 10/10 10/10          Palmar Abduction -     Radial Abduction -         Finger   Index Middle Ring Small    MCP 45 - - -    PIP 0 - - -    DIP 0 - - -   ADPC (cm)   DPC DPC DPC       Hand Strength  DEFERRED   Roman Dynamometer    Gross Grasp (lbs)  Right Left   2nd setting - -       Pinch Strength Right Left   Lateral - -   Tripod - -   Tip  - -       TREATMENT:  -Wrist maze with IF extended throughout   -Retrograde massage to reduce swelling   -Pro/sup stretch with red tbar in hand, index finger extended throughout no actively holding onto tbar, no strengthening, just ROM   -Threading beads onto  with R MF and thumb   -expectations discussed of OT post pin removal  -Lumbrical grasp with support on volar hand to inc MCP flexion/ext  -Edema Management including elevation, cryotherapy, Compression     HEP and Patient Education:  -See treatment section above.   -Orthosis full time off for hygiene and exercises. Educated patient to wear, care,  purpose, and precautions.  -Educated patient to diagnosis and rehab expectations including frequency and duration of services.         PLAN OF CARE:   Continue to follow NWB RUE and NO ROM of PIP and DIP until cleared by physician.   Follow Up with Referring Physician: Dr. Thomas Swan  Monitor [facility]  Patient instructed to call or email with questions or concerns.     Frequency: 1x/week  Duration: 6 weeks    GOALS:   Patient to demonstrate, with involved extremity (ies):    -good skill with progressive edema reduction technique facilitating gains with motion and function.   -good carry through with progressive soft tissue management techniques facilitating gains with motion and pain reduction.   -finger AROM BALL 220 degrees, to maintain grasp on ADL objects during use.  -Self report of independence with clothing fasteners without pain.   -independence opening packages, [place] Pinch 16#  -independence opening jars and turning door knobs,  Strength 70#  -good skill with progressive orthosis regimen, applying orthosis correctly and using according to medically necessary wear schedule as prescribed by therapist  -Patient to report pain 0/10 at rest for sleeping  -Patient to score disability rate less than 10% on Quick DASH  Patient verbalized good understanding of Therapy Plan of Care and is in a agreement with Occupational Therapy Goals.       CHART REVIEW: YES       OT Therapeutic Procedures Time Entry  Therapeutic Exercise Time Entry: 40

## 2025-06-13 ENCOUNTER — APPOINTMENT (OUTPATIENT)
Dept: OCCUPATIONAL THERAPY | Facility: CLINIC | Age: 20
End: 2025-06-13
Payer: COMMERCIAL

## 2025-06-18 ENCOUNTER — TREATMENT (OUTPATIENT)
Dept: OCCUPATIONAL THERAPY | Facility: CLINIC | Age: 20
End: 2025-06-18
Payer: COMMERCIAL

## 2025-06-18 DIAGNOSIS — S67.21XA CRUSH INJURY OF HAND, RIGHT, INITIAL ENCOUNTER: ICD-10-CM

## 2025-06-18 DIAGNOSIS — S61.219A FINGER LACERATION INVOLVING TENDON, INITIAL ENCOUNTER: Primary | ICD-10-CM

## 2025-06-18 PROCEDURE — 97140 MANUAL THERAPY 1/> REGIONS: CPT | Mod: GO

## 2025-06-18 PROCEDURE — 97110 THERAPEUTIC EXERCISES: CPT | Mod: GO

## 2025-06-18 ASSESSMENT — PAIN - FUNCTIONAL ASSESSMENT: PAIN_FUNCTIONAL_ASSESSMENT: 0-10

## 2025-06-18 ASSESSMENT — PAIN SCALES - GENERAL: PAINLEVEL_OUTOF10: 1

## 2025-06-18 NOTE — PROGRESS NOTES
Occupational Therapy   Upper Extremity Evaluation Note    Patient Name: Jorge Lawton  MRN: [id number]   Date of Injury: 5/5/25  Mechanism of Injury: Work injury got stuck in    Date of Surgery: 5/7/25  Surgical Procedure: Pins and extensor tendon   Precautions:  SHEILA SHABAZZ          Current Problem  1. Finger laceration involving tendon, initial encounter        2. Crush injury of hand, right, initial encounter               Insurance  [facility] OT 12V OPEN DATE RANGE-[place]     Medicare Certification Period:     GENERAL  General  General Comment: Visit #4/12    IMAGING: Redemonstration of fracture K-wire fixation of a comminuted fracture  through the 2nd distal phalanx. Osseous productive changes and soft  tissue edema present. The hardware is intact           Subjective  Pt report: Patient reporting noticing guarding of finger, pt reporting attempting to address MCP and wrist ROM deficits from guarding in home setting.   Patient would like to functionally improve/chief concern: Increase ROM, decrease edema, get strength back, return to work and lifting at the gym       Pain:  Location: Entire index finger Right   0/10 at rest, 9/10 at highest  Description: aching, throbbing, sharp sometimes     ASSESSMENT:  Patient with good tolerance to OT session. Pt with no inc in pain throughout activity. Pt removing dressing/orthosis and scar healing flat and appropriately. Patient with mild moisture at base of bandage, instructed to air out and make sure skin is completely dry at all times before donning orthosis and bandage again.   Patient is a 19 y.o. RHDM who is s/p pin fixation of PIP and DIP of R IF with extensor tendon involved     Homeliving/Social Support: house multilevel, Girlfriend, girlfriends grandmother, and girlfriends little sister     Work: [employer]     Meaningful Activities: go to the gym, spending time with family and friends, fishing, driving    Previous Level of Function Per Patient/Caregiver  Report: Independent with ADL, IADL, work and leisure activities    Chief complaint: stiffness, pain, swelling, arm stiffness     Patient stated goals for therapy: gain use of my finger     Objective  Hand Dominance: RIGHT     Affected Extremity:   RIGHT     Outcome Measures:   At Eval: Quick Dash 75    ADLS/IADLS: MOD IND    Skin/ Wound/Scar: Scar flat, healing well. Tender as expected for stage of recovery.     Sensation: WFL    Dexterity: Monitor    Special Tests: NA         Edema (cm):   Date: 6/11/25 Right Left   WRIST DWC 16.6 -          Date: 6/4/25 Right Left   FINGER IF P1 7.0 7.2    PIP Circumference 6.2 6.7    P2 5.6 6.3    DIP Circumference 5.3 5.8       ROM and STRENGTH      AROM  Right Left   Elbow Extension/Flexion           Forearm  Pronation/Supination           Wrist Extension/Flexion 55/65     Radial Deviation/Ulnar Deviation       Hand ROM  THUMB AROM  Right Left    Kapandji 10/10 10/10        Finger   Index Middle Ring Small    MCP 45 - - -    PIP 0 - - -    DIP 0 - - -   ADPC (cm)   DPC DPC DPC       Hand Strength  DEFERRED   Roman Dynamometer    Gross Grasp (lbs)  Right Left   2nd setting - -       Pinch Strength Right Left   Lateral - -   Tripod - -   Tip  - -       TREATMENT:  -Measurements taken   -Retrograde massage to reduce swelling   -Pro/sup stretch with red tbar in hand, index finger extended throughout no actively holding onto tbar, no strengthening, just ROM   -Volar block MCP flex/ext AROM with PIP and DIP extended   -expectations discussed of OT post pin removal  -Pt removing orthosis and dressing, finger nail appearing beginning to grow, all scars healing flat and appropraitely   -Lumbrical grasp with support on volar hand to inc MCP flexion/ext  -Edema Management including elevation, cryotherapy, Compression     HEP and Patient Education:  -See treatment section above.   -Orthosis full time off for hygiene and exercises. Educated patient to wear, care, purpose, and  precautions.  -Educated patient to diagnosis and rehab expectations including frequency and duration of services.         PLAN OF CARE:   Pt will benefit from skilled OT to decrease edema and inc ROM and strength once percutaneous pin removed.   Continue to follow NWB RUE and NO ROM of DIP until cleared by physician.   Follow Up with Referring Physician: Dr. Thomas Swan  Monitor Home Program  Patient instructed to call or email with questions or concerns.     Frequency: 1x/week  Duration: 6 weeks    GOALS:   Patient to demonstrate, with involved extremity (ies):    -good skill with progressive edema reduction technique facilitating gains with motion and function.   -good carry through with progressive soft tissue management techniques facilitating gains with motion and pain reduction.   -finger AROM BALL 220 degrees, to maintain grasp on ADL objects during use.  -Self report of independence with clothing fasteners without pain.   -independence opening packages, [place] Pinch 16#  -independence opening jars and turning door knobs,  Strength 70#  -good skill with progressive orthosis regimen, applying orthosis correctly and using according to medically necessary wear schedule as prescribed by therapist  -Patient to report pain 0/10 at rest for sleeping  -Patient to score disability rate less than 10% on Quick DASH  Patient verbalized good understanding of Therapy Plan of Care and is in a agreement with Occupational Therapy Goals.       CHART REVIEW: YES       OT Therapeutic Procedures Time Entry  Therapeutic Exercise Time Entry: 32  Manual Therapy Time Entry: 8

## 2025-06-20 ENCOUNTER — APPOINTMENT (OUTPATIENT)
Dept: OCCUPATIONAL THERAPY | Facility: CLINIC | Age: 20
End: 2025-06-20
Payer: COMMERCIAL

## 2025-06-25 ENCOUNTER — TREATMENT (OUTPATIENT)
Dept: OCCUPATIONAL THERAPY | Facility: CLINIC | Age: 20
End: 2025-06-25
Payer: COMMERCIAL

## 2025-06-25 DIAGNOSIS — S61.219A FINGER LACERATION INVOLVING TENDON, INITIAL ENCOUNTER: Primary | ICD-10-CM

## 2025-06-25 DIAGNOSIS — S62.620B DISPLACED FRACTURE OF MIDDLE PHALANX OF RIGHT INDEX FINGER, INITIAL ENCOUNTER FOR OPEN FRACTURE: ICD-10-CM

## 2025-06-25 DIAGNOSIS — S67.21XA CRUSH INJURY OF HAND, RIGHT, INITIAL ENCOUNTER: ICD-10-CM

## 2025-06-25 PROCEDURE — 97110 THERAPEUTIC EXERCISES: CPT | Mod: GO

## 2025-06-25 ASSESSMENT — PAIN SCALES - GENERAL: PAINLEVEL_OUTOF10: 2

## 2025-06-25 ASSESSMENT — PAIN - FUNCTIONAL ASSESSMENT: PAIN_FUNCTIONAL_ASSESSMENT: 0-10

## 2025-06-25 NOTE — PROGRESS NOTES
Occupational Therapy   Upper Extremity Evaluation Note    Patient Name: Jorge Lawton  MRN: [id number]   Date of Injury: 5/5/25  Mechanism of Injury: Work injury got stuck in    Date of Surgery: 5/7/25  Surgical Procedure: Pins and extensor tendon   Precautions:  SHEILA SHABAZZ          Current Problem  1. Finger laceration involving tendon, initial encounter        2. Crush injury of hand, right, initial encounter        3. Displaced fracture of middle phalanx of right index finger, initial encounter for open fracture                 Insurance  [facility] OT 12V OPEN DATE RANGE-[place]     Medicare Certification Period:     GENERAL  General  General Comment: Visit #5/12    IMAGING: Redemonstration of fracture K-wire fixation of a comminuted fracture  through the 2nd distal phalanx. Osseous productive changes and soft  tissue edema present. The hardware is intact           Subjective  Pt report: Patient reporting being able to play video games using MF without it causing IF pain. Patient reporting feeling extremely nervous for pin removal next week.   Patient would like to functionally improve/chief concern: Increase ROM, decrease edema, get strength back, return to work and lifting at the gym       Pain:  Location: Entire index finger Right   0/10 at rest, 9/10 at highest  Description: aching, throbbing, sharp sometimes     ASSESSMENT:  Patient with good tolerance to OT session. Pt with minor inc in pain with PIP flexion however pt reporting more 'nervous' than 'pain'. Pt demonstrating inc ROM at MCP and PIP. Pt progressing well.   Patient is a 19 y.o. RHDM who is s/p pin fixation of PIP and DIP of R IF with extensor tendon involved     Homeliving/Social Support: house multilevel, Girlfriend, girlfriends grandmother, and girlfriends little sister     Work: [employer]     Meaningful Activities: go to the gym, spending time with family and friends, fishing, driving    Previous Level of Function Per Patient/Caregiver  Report: Independent with ADL, IADL, work and leisure activities    Chief complaint: stiffness, pain, swelling, arm stiffness     Patient stated goals for therapy: gain use of my finger     Objective  Hand Dominance: RIGHT     Affected Extremity:   RIGHT     Outcome Measures:   At Eval: Quick Dash 75    ADLS/IADLS: MOD IND    Skin/ Wound/Scar: Scar flat, healing well. Tender as expected for stage of recovery.     Sensation: WFL    Dexterity: Monitor    Special Tests: NA         Edema (cm):   Date: 6/11/25 Right Left   WRIST DWC 16.6 -          Date: 6/4/25 Right Left   FINGER IF P1 7.0 7.2    PIP Circumference 6.2 6.7    P2 5.6 6.3    DIP Circumference 5.3 5.8       ROM and STRENGTH      AROM  Right Left   Elbow Extension/Flexion           Forearm  Pronation/Supination           Wrist Extension/Flexion 55/65     Radial Deviation/Ulnar Deviation       Hand ROM  THUMB AROM  Right Left    Kapandji 10/10 10/10        Finger   Index Middle Ring Small    MCP 45 - - -    PIP 0 - - -    DIP 0 - - -   ADPC (cm)   DPC DPC DPC       Hand Strength  DEFERRED   Roman Dynamometer    Gross Grasp (lbs)  Right Left   2nd setting - -       Pinch Strength Right Left   Lateral - -   Tripod - -   Tip  - -       TREATMENT:  -OT removing dressing/orthosis, skin appearing extremely most, OT requesting pt to keep dressing off until skin dry.   -DIP blocked, PIP flex/ext   -DIP blocked, MCP and PIP flex/ext  -AROM around tbar for PIP flexion   -expectations discussed of OT post pin removal  -Pt removing orthosis and dressing, finger nail appearing beginning to grow, all scars healing flat and appropraitely, minor pain with palpation of fingernail   -Lumbrical grasp with support on volar hand to inc MCP flexion/ext  -Edema Management including elevation, cryotherapy, Compression     HEP and Patient Education:  -See treatment section above.   -Orthosis full time off for hygiene and exercises. Educated patient to wear, care, purpose, and  precautions.  -Educated patient to diagnosis and rehab expectations including frequency and duration of services.         PLAN OF CARE:   Pt will benefit from skilled OT to decrease edema and inc ROM and strength once percutaneous pin removed.   Continue to follow NWB RUE and NO ROM of DIP until cleared by physician.   Follow Up with Referring Physician: Dr. Thomas Swan  Monitor Home Program  Patient instructed to call or email with questions or concerns.     Frequency: 1x/week  Duration: 6 weeks    GOALS:   Patient to demonstrate, with involved extremity (ies):    -good skill with progressive edema reduction technique facilitating gains with motion and function.   -good carry through with progressive soft tissue management techniques facilitating gains with motion and pain reduction.   -finger AROM BALL 220 degrees, to maintain grasp on ADL objects during use.  -Self report of independence with clothing fasteners without pain.   -independence opening packages, [place] Pinch 16#  -independence opening jars and turning door knobs,  Strength 70#  -good skill with progressive orthosis regimen, applying orthosis correctly and using according to medically necessary wear schedule as prescribed by therapist  -Patient to report pain 0/10 at rest for sleeping  -Patient to score disability rate less than 10% on Quick DASH  Patient verbalized good understanding of Therapy Plan of Care and is in a agreement with Occupational Therapy Goals.       CHART REVIEW: YES       OT Therapeutic Procedures Time Entry  Therapeutic Exercise Time Entry: 40

## 2025-06-27 ENCOUNTER — APPOINTMENT (OUTPATIENT)
Dept: OCCUPATIONAL THERAPY | Facility: CLINIC | Age: 20
End: 2025-06-27
Payer: COMMERCIAL

## 2025-07-02 ENCOUNTER — TREATMENT (OUTPATIENT)
Dept: OCCUPATIONAL THERAPY | Facility: CLINIC | Age: 20
End: 2025-07-02
Payer: COMMERCIAL

## 2025-07-02 DIAGNOSIS — S62.620B DISPLACED FRACTURE OF MIDDLE PHALANX OF RIGHT INDEX FINGER, INITIAL ENCOUNTER FOR OPEN FRACTURE: ICD-10-CM

## 2025-07-02 DIAGNOSIS — S61.219A FINGER LACERATION INVOLVING TENDON, INITIAL ENCOUNTER: Primary | ICD-10-CM

## 2025-07-02 DIAGNOSIS — S67.21XA CRUSH INJURY OF HAND, RIGHT, INITIAL ENCOUNTER: ICD-10-CM

## 2025-07-02 PROCEDURE — 97110 THERAPEUTIC EXERCISES: CPT | Mod: GO

## 2025-07-02 ASSESSMENT — PAIN - FUNCTIONAL ASSESSMENT: PAIN_FUNCTIONAL_ASSESSMENT: 0-10

## 2025-07-02 NOTE — PROGRESS NOTES
"    Occupational Therapy   Upper Extremity Evaluation Note    Patient Name: Jorge Lawton  MRN: [id number]   Date of Injury: 5/5/25  Mechanism of Injury: Work injury got stuck in    Date of Surgery: 5/7/25  Surgical Procedure: Pins and extensor tendon   Precautions:  NWB MELE          Current Problem  1. Finger laceration involving tendon, initial encounter        2. Crush injury of hand, right, initial encounter        3. Displaced fracture of middle phalanx of right index finger, initial encounter for open fracture                   Insurance  [facility] OT 12V OPEN DATE RANGE-[place]     Medicare Certification Period:     GENERAL  General  General Comment: Visit #6/12    IMAGING: Redemonstration of fracture K-wire fixation of a comminuted fracture  through the 2nd distal phalanx. Osseous productive changes and soft  tissue edema present. The hardware is intact           Subjective  Pt report: \"Someone tried to grab my phone out of my hand and it pulled on my finger. My finger was tender for a day but it feels better now.\"   Patient would like to functionally improve/chief concern: Increase ROM, decrease edema, get strength back, return to work and lifting at the gym       Pain:  Location: Entire index finger Right   0/10 at rest, 9/10 at highest  Description: aching, throbbing, sharp sometimes     ASSESSMENT:  Patient with good tolerance to OT session. Pt with minor inc in pain with PIP flexion however pt reporting more 'nervous' than 'pain'. Pt demonstrating inc ROM at MCP and PIP. Pt progressing well.   Patient is a 19 y.o. RHDM who is s/p pin fixation of PIP and DIP of R IF with extensor tendon involved     Homeliving/Social Support: house multilevel, Girlfriend, girlfriends grandmother, and girlfriends little sister     Work: [employer]     Meaningful Activities: go to the gym, spending time with family and friends, fishing, driving    Previous Level of Function Per Patient/Caregiver Report: Independent " with ADL, IADL, work and leisure activities    Chief complaint: stiffness, pain, swelling, arm stiffness     Patient stated goals for therapy: gain use of my finger     Objective  Hand Dominance: RIGHT     Affected Extremity:   RIGHT     Outcome Measures:   At Eval: Quick Dash 75    ADLS/IADLS: MOD IND    Skin/ Wound/Scar: Scar flat, healing well. Tender as expected for stage of recovery.     Sensation: WFL    Dexterity: Monitor    Special Tests: NA         Edema (cm):   Date: 7/2/25 Right Left   FINGER IF P1 6.9 7.2    PIP Circumference 6.8 6.7    P2 6.0 6.3    DIP Circumference 5.1 5.8       ROM and STRENGTH  Hand ROM  THUMB AROM  Right Left    Kapandji 10/10 10/10        Finger   Index Middle Ring Small    MCP 45 - - -    PIP 55 - - -    DIP 0 - - -   ADPC (cm)   DPC DPC DPC       Hand Strength  DEFERRED   Roman Dynamometer    Gross Grasp (lbs)  Right Left   2nd setting - -       Pinch Strength Right Left   Lateral - -   Tripod - -   Tip  - -       TREATMENT:  -OT removing dressing/orthosis, skin appearing extremely most, OT requesting pt to keep dressing off until skin dry.   -DIP blocked, MCP and PIP flex/ext  -AROM around tbar for PIP flexion   -Wrist maze   -Ice roll volar finger   -expectations discussed of OT post pin removal  -Pt removing orthosis and dressing, finger nail appearing beginning to grow, all scars healing flat and appropraitely, minor pain with palpation of fingernail   -Lumbrical grasp with support on volar hand to inc MCP flexion/ext  -Edema Management including elevation, cryotherapy, Compression     HEP and Patient Education:  -See treatment section above.   -Orthosis full time off for hygiene and exercises. Educated patient to wear, care, purpose, and precautions.  -Educated patient to diagnosis and rehab expectations including frequency and duration of services.         PLAN OF CARE:   Pt will benefit from skilled OT to decrease edema and inc ROM and strength once percutaneous pin  removed.   Continue to follow NWB RUE and NO ROM of DIP until cleared by physician.   Follow Up with Referring Physician: Dr. Thomas Swan  Monitor Home Program  Patient instructed to call or email with questions or concerns.     Frequency: 1x/week  Duration: 12 weeks    GOALS:   Patient to demonstrate, with involved extremity (ies):    -good skill with progressive edema reduction technique facilitating gains with motion and function.   -good carry through with progressive soft tissue management techniques facilitating gains with motion and pain reduction.   -finger AROM BALL 220 degrees, to maintain grasp on ADL objects during use.  -Self report of independence with clothing fasteners without pain.   -independence opening packages, [place] Pinch 16#  -independence opening jars and turning door knobs,  Strength 70#  -good skill with progressive orthosis regimen, applying orthosis correctly and using according to medically necessary wear schedule as prescribed by therapist  -Patient to report pain 0/10 at rest for sleeping  -Patient to score disability rate less than 10% on Quick DASH  Patient verbalized good understanding of Therapy Plan of Care and is in a agreement with Occupational Therapy Goals.       CHART REVIEW: YES       OT Therapeutic Procedures Time Entry  Therapeutic Exercise Time Entry: 40

## 2025-07-03 ENCOUNTER — HOSPITAL ENCOUNTER (OUTPATIENT)
Dept: RADIOLOGY | Facility: CLINIC | Age: 20
Discharge: HOME | End: 2025-07-03
Payer: COMMERCIAL

## 2025-07-03 ENCOUNTER — OFFICE VISIT (OUTPATIENT)
Dept: ORTHOPEDIC SURGERY | Facility: CLINIC | Age: 20
End: 2025-07-03
Payer: COMMERCIAL

## 2025-07-03 DIAGNOSIS — S62.620B DISPLACED FRACTURE OF MIDDLE PHALANX OF RIGHT INDEX FINGER, INITIAL ENCOUNTER FOR OPEN FRACTURE: ICD-10-CM

## 2025-07-03 PROCEDURE — 99212 OFFICE O/P EST SF 10 MIN: CPT | Performed by: ORTHOPAEDIC SURGERY

## 2025-07-03 PROCEDURE — 73140 X-RAY EXAM OF FINGER(S): CPT | Mod: RT

## 2025-07-03 NOTE — PROGRESS NOTES
Patient presents today for ongoing follow-up status post open treatment of complex right index finger injury with components of open fracture to the index middle and distal phalanx.  There was also extensor mechanism disruption through extensor zones 1 and 2.  The patient had pin removal at the previous visit but the longitudinal pin was retained to allow for additional healing to the extensor mechanism.  No signs of infection on today's exam.  Recommendations were made for pin removal.  That was performed and the patient tolerated this adequately.  A dressing was placed.  A splint was placed as well.  Continue with nonweightbearing.  Work on motion recovery at the PIP joint.  Follow-up with me in 2 weeks for x-rays of the right index finger.  Intended to wean from splinting at that time and work on motion recovery through the entire digit.  Until then leave the distal interphalangeal joint immobilized in extension.

## 2025-07-09 ENCOUNTER — APPOINTMENT (OUTPATIENT)
Dept: OCCUPATIONAL THERAPY | Facility: CLINIC | Age: 20
End: 2025-07-09
Payer: COMMERCIAL

## 2025-07-11 ENCOUNTER — TREATMENT (OUTPATIENT)
Dept: OCCUPATIONAL THERAPY | Facility: CLINIC | Age: 20
End: 2025-07-11
Payer: COMMERCIAL

## 2025-07-11 DIAGNOSIS — S67.21XA CRUSH INJURY OF HAND, RIGHT, INITIAL ENCOUNTER: ICD-10-CM

## 2025-07-11 DIAGNOSIS — S62.620B DISPLACED FRACTURE OF MIDDLE PHALANX OF RIGHT INDEX FINGER, INITIAL ENCOUNTER FOR OPEN FRACTURE: ICD-10-CM

## 2025-07-11 DIAGNOSIS — S61.219A FINGER LACERATION INVOLVING TENDON, INITIAL ENCOUNTER: Primary | ICD-10-CM

## 2025-07-11 PROCEDURE — 97110 THERAPEUTIC EXERCISES: CPT | Mod: GO

## 2025-07-11 ASSESSMENT — PAIN - FUNCTIONAL ASSESSMENT: PAIN_FUNCTIONAL_ASSESSMENT: 0-10

## 2025-07-11 ASSESSMENT — PAIN SCALES - GENERAL: PAINLEVEL_OUTOF10: 0 - NO PAIN

## 2025-07-11 NOTE — PROGRESS NOTES
"    Occupational Therapy   Upper Extremity Evaluation Note    Patient Name: Jorge Lawton  MRN: [id number]   Date of Injury: 5/5/25  Mechanism of Injury: Work injury got stuck in    Date of Surgery: 5/7/25  Surgical Procedure: Pins and extensor tendon   Precautions:  NWB RUE until 7/17/25         Current Problem  1. Finger laceration involving tendon, initial encounter        2. Crush injury of hand, right, initial encounter        3. Displaced fracture of middle phalanx of right index finger, initial encounter for open fracture                     Insurance  [facility] OT 12V OPEN DATE RANGE-[place]     Medicare Certification Period:     GENERAL  General  General Comment: Visit #7/12    IMAGING: Redemonstration of fracture K-wire fixation of a comminuted fracture  through the 2nd distal phalanx. Osseous productive changes and soft  tissue edema present. The hardware is intact           Subjective  Pt report: \"Getting my pin out was horrible.\" \"The swelling is so bad, why?\"  Patient would like to functionally improve/chief concern: Increase ROM, decrease edema, get strength back, return to work and lifting at the gym       Pain:  Location: Entire index finger Right   0/10 at rest, 9/10 at highest  Description: aching, throbbing, sharp sometimes     ASSESSMENT:  Patient with good tolerance to OT session. Patient with increased sensitivity on tip of finger. Patient arriving with finger unwrapped discussing need to continue wrapping.   Patient is a 19 y.o. RHDM who is s/p pin fixation of PIP and DIP of R IF with extensor tendon involved     Homeliving/Social Support: house multilevel, Girlfriend, girlfriends grandmother, and girlfriends little sister     Work: [employer]     Meaningful Activities: go to the gym, spending time with family and friends, fishing, driving    Previous Level of Function Per Patient/Caregiver Report: Independent with ADL, IADL, work and leisure activities    Chief complaint: stiffness, " pain, swelling, arm stiffness     Patient stated goals for therapy: gain use of my finger     Objective  Hand Dominance: RIGHT     Affected Extremity:   RIGHT     Outcome Measures:   At Eval: Quick Dash 75    ADLS/IADLS: MOD IND    Skin/ Wound/Scar: Scar flat, healing well. Tender as expected for stage of recovery.     Sensation: WFL    Dexterity: Monitor    Special Tests: NA         Edema (cm):   Date: 7/11/25 Right Left   FINGER IF P1 6.9 7.1    PIP Circumference 6.8 6.6    P2 6.0 6.5    DIP Circumference 5.1 6.0       ROM and STRENGTH  Hand ROM  THUMB AROM  Right Left    Kapandji 10/10 10/10        Finger  7/11/25 Index Middle Ring Small    MCP 65 - - -    PIP 46 - - -    DIP 0 - - -   ADPC (cm)   DPC DPC DPC       Hand Strength  DEFERRED   Roman Dynamometer    Gross Grasp (lbs)  Right Left   2nd setting - -       Pinch Strength Right Left   Lateral - -   Tripod - -   Tip  - -       TREATMENT:  -Measurements taken   -DIP blocked, MCP and PIP flex/ext  -AROM around tbar for PIP flexion   -desensitization with velcro on finger tip   -Fine motor peg board   -Ice roll volar finger   -Lumbrical grasp with support on volar hand to inc MCP flexion/ext  -Edema Management including elevation, cryotherapy, Compression     HEP and Patient Education:  -See treatment section above.   -Orthosis full time off for hygiene and exercises. Educated patient to wear, care, purpose, and precautions.  -Educated patient to diagnosis and rehab expectations including frequency and duration of services.         PLAN OF CARE:   Pt will benefit from skilled OT to decrease edema and inc ROM and strength once percutaneous pin removed.   Continue to follow NWB RUE and NO ROM of DIP until cleared by physician.   Follow Up with Referring Physician: Dr. Thomas Swan  Monitor Home Program  Patient instructed to call or email with questions or concerns.     Frequency: 1x/week  Duration: 12 weeks    GOALS:   Patient to demonstrate, with involved  extremity (ies):    -good skill with progressive edema reduction technique facilitating gains with motion and function.   -good carry through with progressive soft tissue management techniques facilitating gains with motion and pain reduction.   -finger AROM BALL 220 degrees, to maintain grasp on ADL objects during use.  -Self report of independence with clothing fasteners without pain.   -independence opening packages, [place] Pinch 16#  -independence opening jars and turning door knobs,  Strength 70#  -good skill with progressive orthosis regimen, applying orthosis correctly and using according to medically necessary wear schedule as prescribed by therapist  -Patient to report pain 0/10 at rest for sleeping  -Patient to score disability rate less than 10% on Quick DASH  Patient verbalized good understanding of Therapy Plan of Care and is in a agreement with Occupational Therapy Goals.       CHART REVIEW: YES       OT Therapeutic Procedures Time Entry  Therapeutic Exercise Time Entry: 40

## 2025-07-15 ENCOUNTER — OFFICE VISIT (OUTPATIENT)
Dept: ORTHOPEDIC SURGERY | Facility: CLINIC | Age: 20
End: 2025-07-15
Payer: COMMERCIAL

## 2025-07-15 ENCOUNTER — HOSPITAL ENCOUNTER (OUTPATIENT)
Dept: RADIOLOGY | Facility: CLINIC | Age: 20
Discharge: HOME | End: 2025-07-15
Payer: COMMERCIAL

## 2025-07-15 DIAGNOSIS — S61.209A EXTENSOR TENDON LACERATION OF FINGER WITH OPEN WOUND, INITIAL ENCOUNTER: ICD-10-CM

## 2025-07-15 DIAGNOSIS — S61.219A FINGER LACERATION INVOLVING TENDON, INITIAL ENCOUNTER: Primary | ICD-10-CM

## 2025-07-15 DIAGNOSIS — S56.429A EXTENSOR TENDON LACERATION OF FINGER WITH OPEN WOUND, INITIAL ENCOUNTER: ICD-10-CM

## 2025-07-15 DIAGNOSIS — S62.620B DISPLACED FRACTURE OF MIDDLE PHALANX OF RIGHT INDEX FINGER, INITIAL ENCOUNTER FOR OPEN FRACTURE: ICD-10-CM

## 2025-07-15 PROCEDURE — 73140 X-RAY EXAM OF FINGER(S): CPT | Mod: RT

## 2025-07-15 PROCEDURE — 99212 OFFICE O/P EST SF 10 MIN: CPT | Performed by: ORTHOPAEDIC SURGERY

## 2025-07-15 PROCEDURE — 99024 POSTOP FOLLOW-UP VISIT: CPT | Performed by: ORTHOPAEDIC SURGERY

## 2025-07-15 NOTE — PROGRESS NOTES
7/15/2025    Chief Complaint   Patient presents with    Right Index Finger - Follow-up, Worker's Compensation     I&D with open treatment to middle phalanx fx  DOS: 5/7/25  Xrays today       History of Present Illness:  Patient Jorge Lawton , 20 y.o. male, presents today, 7/15/2025, for evaluation of right index finger complex laceration to the extensor mechanism over dorsum of index finger with open middle phalanx fracture, 10 weeks out from excisional debridement and ORIF.  Patient underwent removal of longitudinal pin at last visit.  He is working with therapy on motion recovery.  Overall the finger is stiff, however, he feels he is making some functional gains and improvement.  He is guarded to the digit has a lot of sensitivity.  No fevers, chills, constitutional symptoms reported.       Review of Systems:   GENERAL: Negative  GI: Negative  MUSCULOSKELETAL: See HPI  SKIN: Negative  NEURO:  Negative     Physical Exam:  GENERAL:  Alert and oriented to person, place, and time.  No acute distress and breathing comfortably; pleasant and cooperative with the examination.  HEENT:  Head is normocephalic and atraumatic.  NECK:  Supple, no visible swelling.  CARDIOVASCULAR:  No palpable tachycardia.  LUNGS:  No audible wheezing or labored breathing.  ABDOMEN:  Nondistended.  Extremities: Evaluation of the right upper extremity finds the patient to have a palpable radial artery at the wrist with brisk capillary refill to all digits. The patient has intact sensorium to axillary, radial, median and ulnar nerves. There are no open wounds. There are no signs of infection. There is no evidence of lymphedema or lymphatic streaking. The patient has supple compartments of the right arm, forearm and hand.  Surgical incisions and wounds are all well-healed.  There is appropriate growth of new nail plate.  He has some extensor lag across distal interphalangeal joint of about 10 degrees he has flexion of the digit with palm to  pulp distance of about a centimeter and a half.  He is very tender and sensitive to palpation over the dorsum of the digit.     Imaging/Test Results:  3 views of the right index finger show evidence of healing middle phalanx fracture.  Increased callus formation is noted.  Adequate alignment in all planes.  Interval removal of indwelling hardware/longitudinal K wires noted.     Assessment:  Complex laceration of right index finger with open treatment of middle phalanx fracture and extensor tendon repair, 10 weeks postop.     Plan:  Patient can continue to work with aggressive motion recovery under the guidance of formal therapy.  New requisition provided.  Progress to weightbearing activities as tolerated.  Will continue with the same work restrictions however as she should remain in a protected environment there.  Follow-up with our office again in 6 weeks for repeat clinical and radiographic exam, x-rays 3 views of the right index finger upon return.  All questions answered at today's visit.    Veronica Holt PA-C

## 2025-08-14 ENCOUNTER — TREATMENT (OUTPATIENT)
Dept: OCCUPATIONAL THERAPY | Facility: CLINIC | Age: 20
End: 2025-08-14
Payer: COMMERCIAL

## 2025-08-14 DIAGNOSIS — M25.641 JOINT STIFFNESS OF HAND, RIGHT: Primary | ICD-10-CM

## 2025-08-14 PROCEDURE — 97110 THERAPEUTIC EXERCISES: CPT | Mod: GO

## 2025-08-14 ASSESSMENT — PAIN - FUNCTIONAL ASSESSMENT: PAIN_FUNCTIONAL_ASSESSMENT: 0-10

## 2025-08-14 ASSESSMENT — PAIN SCALES - GENERAL: PAINLEVEL_OUTOF10: 0 - NO PAIN

## 2025-08-20 ENCOUNTER — TREATMENT (OUTPATIENT)
Dept: OCCUPATIONAL THERAPY | Facility: CLINIC | Age: 20
End: 2025-08-20
Payer: COMMERCIAL

## 2025-08-20 DIAGNOSIS — S67.21XA CRUSH INJURY OF HAND, RIGHT, INITIAL ENCOUNTER: ICD-10-CM

## 2025-08-20 DIAGNOSIS — S61.219A FINGER LACERATION INVOLVING TENDON, INITIAL ENCOUNTER: ICD-10-CM

## 2025-08-20 DIAGNOSIS — S61.209A EXTENSOR TENDON LACERATION OF FINGER WITH OPEN WOUND, INITIAL ENCOUNTER: ICD-10-CM

## 2025-08-20 DIAGNOSIS — S62.620B DISPLACED FRACTURE OF MIDDLE PHALANX OF RIGHT INDEX FINGER, INITIAL ENCOUNTER FOR OPEN FRACTURE: ICD-10-CM

## 2025-08-20 DIAGNOSIS — S56.429A EXTENSOR TENDON LACERATION OF FINGER WITH OPEN WOUND, INITIAL ENCOUNTER: ICD-10-CM

## 2025-08-20 PROCEDURE — 97110 THERAPEUTIC EXERCISES: CPT | Mod: GO

## 2025-08-20 ASSESSMENT — PAIN SCALES - GENERAL: PAINLEVEL_OUTOF10: 0 - NO PAIN

## 2025-08-20 ASSESSMENT — PAIN - FUNCTIONAL ASSESSMENT: PAIN_FUNCTIONAL_ASSESSMENT: 0-10

## 2025-08-26 ENCOUNTER — HOSPITAL ENCOUNTER (OUTPATIENT)
Dept: RADIOLOGY | Facility: CLINIC | Age: 20
Discharge: HOME | End: 2025-08-26
Payer: COMMERCIAL

## 2025-08-26 ENCOUNTER — OFFICE VISIT (OUTPATIENT)
Dept: ORTHOPEDIC SURGERY | Facility: CLINIC | Age: 20
End: 2025-08-26
Payer: COMMERCIAL

## 2025-08-26 DIAGNOSIS — S67.21XA CRUSH INJURY OF HAND, RIGHT, INITIAL ENCOUNTER: ICD-10-CM

## 2025-08-26 DIAGNOSIS — S62.620B DISPLACED FRACTURE OF MIDDLE PHALANX OF RIGHT INDEX FINGER, INITIAL ENCOUNTER FOR OPEN FRACTURE: ICD-10-CM

## 2025-08-26 DIAGNOSIS — S61.219A FINGER LACERATION INVOLVING TENDON, INITIAL ENCOUNTER: ICD-10-CM

## 2025-08-26 DIAGNOSIS — S61.209A EXTENSOR TENDON LACERATION OF FINGER WITH OPEN WOUND, INITIAL ENCOUNTER: ICD-10-CM

## 2025-08-26 DIAGNOSIS — S56.429A EXTENSOR TENDON LACERATION OF FINGER WITH OPEN WOUND, INITIAL ENCOUNTER: ICD-10-CM

## 2025-08-26 PROCEDURE — 73140 X-RAY EXAM OF FINGER(S): CPT | Mod: RT

## 2025-08-26 PROCEDURE — 99212 OFFICE O/P EST SF 10 MIN: CPT | Performed by: ORTHOPAEDIC SURGERY

## 2025-08-27 ENCOUNTER — APPOINTMENT (OUTPATIENT)
Dept: OCCUPATIONAL THERAPY | Facility: CLINIC | Age: 20
End: 2025-08-27
Payer: COMMERCIAL